# Patient Record
Sex: FEMALE | URBAN - METROPOLITAN AREA
[De-identification: names, ages, dates, MRNs, and addresses within clinical notes are randomized per-mention and may not be internally consistent; named-entity substitution may affect disease eponyms.]

---

## 2018-09-25 ENCOUNTER — EMERGENCY (EMERGENCY)
Facility: HOSPITAL | Age: 23
LOS: 0 days | Discharge: HOME | End: 2018-09-25
Attending: STUDENT IN AN ORGANIZED HEALTH CARE EDUCATION/TRAINING PROGRAM | Admitting: STUDENT IN AN ORGANIZED HEALTH CARE EDUCATION/TRAINING PROGRAM
Payer: SELF-PAY

## 2018-09-25 VITALS
HEART RATE: 87 BPM | SYSTOLIC BLOOD PRESSURE: 119 MMHG | TEMPERATURE: 99 F | DIASTOLIC BLOOD PRESSURE: 88 MMHG | OXYGEN SATURATION: 96 %

## 2018-09-25 VITALS — HEIGHT: 62 IN | WEIGHT: 169.98 LBS

## 2018-09-25 DIAGNOSIS — J45.901 UNSPECIFIED ASTHMA WITH (ACUTE) EXACERBATION: ICD-10-CM

## 2018-09-25 DIAGNOSIS — R06.2 WHEEZING: ICD-10-CM

## 2018-09-25 DIAGNOSIS — F17.210 NICOTINE DEPENDENCE, CIGARETTES, UNCOMPLICATED: ICD-10-CM

## 2018-09-25 PROCEDURE — 99284 EMERGENCY DEPT VISIT MOD MDM: CPT

## 2018-09-25 RX ORDER — IPRATROPIUM/ALBUTEROL SULFATE 18-103MCG
3 AEROSOL WITH ADAPTER (GRAM) INHALATION ONCE
Qty: 0 | Refills: 0 | Status: COMPLETED | OUTPATIENT
Start: 2018-09-25 | End: 2018-09-25

## 2018-09-25 RX ORDER — ALBUTEROL 90 UG/1
1 AEROSOL, METERED ORAL ONCE
Qty: 0 | Refills: 0 | Status: COMPLETED | OUTPATIENT
Start: 2018-09-25 | End: 2018-09-25

## 2018-09-25 RX ORDER — IPRATROPIUM/ALBUTEROL SULFATE 18-103MCG
3 AEROSOL WITH ADAPTER (GRAM) INHALATION ONCE
Qty: 0 | Refills: 0 | Status: DISCONTINUED | OUTPATIENT
Start: 2018-09-25 | End: 2018-09-25

## 2018-09-25 RX ADMIN — ALBUTEROL 1 PUFF(S): 90 AEROSOL, METERED ORAL at 23:39

## 2018-09-25 RX ADMIN — Medication 3 MILLILITER(S): at 22:13

## 2018-09-25 NOTE — ED ADULT TRIAGE NOTE - CHIEF COMPLAINT QUOTE
Pt reports to ED complaining of asthma exacerbation. Pt states that she has been having some shortness of breath for the past few days. Pt uses a rescue inhaler at home but has lost it and has not had it for approx 5 days. Pt does not appear to be in any respiratory distress at triage.

## 2018-09-25 NOTE — ED ADULT NURSE NOTE - NS ED NURSE DC INFO COMPLEXITY
Straightforward: Basic instructions, no meds, no home treatment/Patient asked questions/Returned Demonstration/Verbalized Understanding/Simple: Patient demonstrates quick and easy understanding

## 2018-09-25 NOTE — ED ADULT NURSE NOTE - OBJECTIVE STATEMENT
Pt presents to the ed c/o sob over the past couple of days w/ wheezing, pt states she lost her inhaler and hasn't been able to use it. Denies any chest pain. Pt given duo neb + on pulse ox.

## 2018-09-25 NOTE — ED ADULT NURSE NOTE - CHPI ED NUR SYMPTOMS NEG
no fever/no decreased eating/drinking/no pain/no nausea/no tingling/no weakness/no chills/no dizziness/no vomiting

## 2018-09-25 NOTE — ED PROVIDER NOTE - ATTENDING CONTRIBUTION TO CARE
I, Kimberly Bellamy DO,  performed the initial face to face bedside interview with this patient regarding history of present illness, review of symptoms and relevant past medical, social and family history.  I completed an independent physical examination.  I was the initial provider who evaluated this patient. I have signed out the follow up of any pending tests (i.e. labs, radiological studies) to the ACP.  I have communicated the patient’s plan of care and disposition with the ACP.  The history, relevant review of systems, past medical and surgical history, medical decision making, and physical examination was documented by the scribe in my presence and I attest to the accuracy of the documentation.

## 2018-09-25 NOTE — ED PROVIDER NOTE - PROGRESS NOTE DETAILS
23 y/o F with PMH of asthma presents with exacerbation x 2 days. States she lost her pump apx 5 days ago. 23 y/o F with PMH of asthma presents with exacerbation x 2 days. States she lost her pump apx 5 days ago. Attributes onset of symptoms to recent cold weather. No sick contacts, fever, chills, nausea, CP, palpitations. Admits to associated SOB and nonproductive cough. PE: diffuse wheezing bilaterally. A/P: Asthma exacerbation. No longer wheezing after duoneb x 3. Will d/c home with albuterol pump. Pt refusing PO steroids. - Otilio Marquez PA-C Josesito DO: Lungs CTA on re-evaluation; patient feels better at this time; no steroids given as she reports that she won't take them if prescribed; f/u with pmd in 1 day without fail; patient given albuterol inhaler to go home with.

## 2018-09-25 NOTE — ED ADULT NURSE NOTE - NSIMPLEMENTINTERV_GEN_ALL_ED
Implemented All Universal Safety Interventions:  [place] to call system. Call bell, personal items and telephone within reach. Instruct patient to call for assistance. Room bathroom lighting operational. Non-slip footwear when patient is off stretcher. Physically safe environment: no spills, clutter or unnecessary equipment. Stretcher in lowest position, wheels locked, appropriate side rails in place.

## 2018-09-25 NOTE — ED PROVIDER NOTE - OBJECTIVE STATEMENT
21 y/o female with a PMHx of asthma presents to the ED c/o  SOB, wheezing, non-productive cough x2 days. Symptoms are worse at night. Pt notes she lost her inhaler so was not able to use it for symptoms. Pt has been hospitalized 5 years ago due to her asthma. No h/o intubation. Smoker. NKDA. No h/o blood clot.

## 2023-09-28 NOTE — ED PROVIDER NOTE - QUALITY
non-productive cough Acitretin Pregnancy And Lactation Text: This medication is Pregnancy Category X and should not be given to women who are pregnant or may become pregnant in the future. This medication is excreted in breast milk.
